# Patient Record
Sex: FEMALE | Race: WHITE
[De-identification: names, ages, dates, MRNs, and addresses within clinical notes are randomized per-mention and may not be internally consistent; named-entity substitution may affect disease eponyms.]

---

## 2017-03-04 ENCOUNTER — HOSPITAL ENCOUNTER (EMERGENCY)
Dept: HOSPITAL 47 - EC | Age: 47
Discharge: HOME | End: 2017-03-04
Payer: COMMERCIAL

## 2017-03-04 VITALS
TEMPERATURE: 98.7 F | DIASTOLIC BLOOD PRESSURE: 82 MMHG | HEART RATE: 77 BPM | RESPIRATION RATE: 20 BRPM | SYSTOLIC BLOOD PRESSURE: 148 MMHG

## 2017-03-04 DIAGNOSIS — Z79.899: ICD-10-CM

## 2017-03-04 DIAGNOSIS — R13.10: Primary | ICD-10-CM

## 2017-03-04 DIAGNOSIS — J45.909: ICD-10-CM

## 2017-03-04 PROCEDURE — 99283 EMERGENCY DEPT VISIT LOW MDM: CPT

## 2017-03-04 NOTE — ED
General Adult HPI





- General


Chief complaint: ENT


Stated complaint: feels like something caught in throat


Time Seen by Provider: 03/04/17 13:50


Source: patient, RN notes reviewed


Mode of arrival: ambulatory


Limitations: no limitations





- History of Present Illness


Initial comments: 





Is a 46-year-old female presents to the emergency department stating that on 

Monday she had taken some fiber pills negative stuck in her throat and then she 

was eating some bread net felt like it got stuck as well.  Patient states there 

are times she something he feels like it stuck in her throat however she has 

never vomited anything back up.  Patient states she ate scrambled eggs and ice 

cream and has been able to drink without having it get stuck.  Patient states 

currently she is not having any symptoms right now but she refuses to eat so she

's wondering if someone can scope her.  I told her that no one will come in 

emergently unless something is stuck by offered to have her try something in 

the ER to eat and we could assess her as she eats it and after she eats.  

Patient refused to eat anything in the ER she stated that she would just as 

soon wait to see her primary medical care doctor on Tuesday.  Patient denies 

any other symptoms at this time.  Patient denies chest pain difficulty 

breathing or shortness of breath per patient denies headache patient denies 

numbness weakness.  Patient denies lightheadedness dizziness or nursing blood 

cell.  Patient denies any recent fever chills cough.  Patient denies abdominal 

pain.





- Related Data


 Home Medications











 Medication  Instructions  Recorded  Confirmed


 


Doxycycline Monohydrate [Monodox] 100 mg PO DAILY 03/04/17 03/04/17


 


Hydrochlorothiazide [Hydrodiuril] 25 mg PO DAILY PRN 03/04/17 03/04/17


 


Loratadine [Claritin] 10 mg PO DAILY 03/04/17 03/04/17


 


Montelukast [Singulair] 10 mg PO DAILY 03/04/17 03/04/17











 Allergies











Allergy/AdvReac Type Severity Reaction Status Date / Time


 


No Known Allergies Allergy   Verified 03/04/17 14:08














Review of Systems


ROS Statement: 


Those systems with pertinent positive or pertinent negative responses have been 

documented in the HPI.





ROS Other: All systems not noted in ROS Statement are negative.





Past Medical History


Past Medical History: Asthma


History of Any Multi-Drug Resistant Organisms: None Reported


Past Surgical History: No Surgical Hx Reported


Past Psychological History: Anxiety, Depression


Smoking Status: Never smoker


Past Alcohol Use History: None Reported


Past Drug Use History: None Reported





General Exam





- General Exam Comments


Initial Comments: 





GENERAL:


Patient is well-developed and well-nourished.  Patient is nontoxic and well-

hydrated and is in no acute distress.





ENT:


Neck is soft and supple.  No significant lymphadenopathy is noted.  Oropharynx 

is clear.  Moist mucous membranes.  Neck has full range of motion without 

eliciting any pain.  There is no thyroid enlargement and no masses were felt.





EYES:


The sclera were anicteric and conjunctiva were pink and moist.  Extraocular 

movements were intact and pupils were equal round and reactive to light.  

Eyelids were unremarkable.





PULMONARY:


Unlabored respirations. 





ABDOMEN:


Soft and nontender with normal bowel sounds.  No palpable organomegaly was 

noted.  There is no palpable pulsatile mass.





SKIN:


Skin is clear with no lesions or rashes and otherwise unremarkable.





NEUROLOGIC:


Patient is alert and oriented x3.  Cranial nerves II through XII are grossly 

intact.  Motor and sensory are also intact.  Normal speech, volume and content.

  Symmetrical smile. 





MUSCULOSKELETAL:


Normal extremities with adequate strength and full range of motion.  





LYMPHATICS:


No significant lymphadenopathy is noted





PSYCHIATRIC:


Normal psychiatric evaluation. 


Limitations: no limitations





Course





 Vital Signs











  03/04/17





  13:48


 


Temperature 98.7 F


 


Pulse Rate 77


 


Respiratory 20





Rate 


 


Blood Pressure 148/82


 


O2 Sat by Pulse 99





Oximetry 














Disposition


Clinical Impression: 


 Dysphagia





Disposition: HOME SELF-CARE


Condition: Good


Instructions:  Dysphagia (ED)


Referrals: 


None,Stated [Primary Care Provider] - 1-2 days


Time of Disposition: 14:26

## 2021-11-24 ENCOUNTER — HOSPITAL ENCOUNTER (EMERGENCY)
Dept: HOSPITAL 47 - EC | Age: 51
Discharge: HOME | End: 2021-11-24
Payer: COMMERCIAL

## 2021-11-24 VITALS
RESPIRATION RATE: 20 BRPM | TEMPERATURE: 98.7 F | HEART RATE: 87 BPM | DIASTOLIC BLOOD PRESSURE: 81 MMHG | SYSTOLIC BLOOD PRESSURE: 137 MMHG

## 2021-11-24 DIAGNOSIS — Z79.51: ICD-10-CM

## 2021-11-24 DIAGNOSIS — T17.228A: Primary | ICD-10-CM
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
## 2021-11-24 DIAGNOSIS — J45.909: ICD-10-CM

## 2021-11-24 PROCEDURE — 99283 EMERGENCY DEPT VISIT LOW MDM: CPT

## 2021-11-24 NOTE — ED
ENT HPI





- General


Chief complaint: ENT


Stated complaint: food in throat


Time Seen by Provider: 11/24/21 16:04


Source: patient, RN notes reviewed


Mode of arrival: ambulatory


Limitations: no limitations





- History of Present Illness


Initial comments: 





Patient is a 51-year-old female presenting to the emergency department with 

concerns of possible food stuck in her throat.  She states this happened about a

week and a half ago, she felt like she could not get food down all the way.  

Patient [facility] evaluated [facility] last week, they recommended coming

here for a scope.  Patient was supposed to follow-up with endo-last week however

she did not.  Patient presents today after she ate some eggs this morning and 

feels like they're stuck.  She is able to tolerate liquids.  His had no 

vomiting.  She denies any abdominal pain, no chest pain or shortness of breath. 

She has no fevers or chills, no upper respiratory symptoms.  She is recovering f

rom a sinus infection, she'll she does have some mild congestion.  Patient has 

no other complaints today.  Her vital signs are stable upon arrival.





- Related Data


                                Home Medications











 Medication  Instructions  Recorded  Confirmed


 


Doxycycline Monohydrate [Monodox] 100 mg PO DAILY 03/04/17 03/04/17


 


Loratadine [Claritin] 10 mg PO DAILY 03/04/17 03/04/17


 


Montelukast [Singulair] 10 mg PO DAILY 03/04/17 03/04/17


 


hydroCHLOROthiazide [Hydrodiuril] 25 mg PO DAILY PRN 03/04/17 03/04/17








                                  Previous Rx's











 Medication  Instructions  Recorded


 


Metoclopramide [Reglan] 10 mg PO BID #10 tab 11/24/21











                                    Allergies











Allergy/AdvReac Type Severity Reaction Status Date / Time


 


No Known Allergies Allergy   Verified 11/24/21 14:31














Review of Systems


ROS Statement: 


Those systems with pertinent positive or pertinent negative responses have been 

documented in the HPI.





ROS Other: All systems not noted in ROS Statement are negative.





Past Medical History


Past Medical History: Asthma


History of Any Multi-Drug Resistant Organisms: None Reported


Past Surgical History: No Surgical Hx Reported


Past Psychological History: Anxiety, Depression


Smoking Status: Never smoker


Past Alcohol Use History: None Reported


Past Drug Use History: None Reported





General Exam





- General Exam Comments


Initial Comments: 





GENERAL: 


Patient is well-developed and well-nourished.  Patient is nontoxic and in no 

acute distress.





HEAD: 


Atraumatic, normocephalic.





EYES:


Pupils equal round and reactive to light, extraocular movements intact, sclera 

anicteric, conjunctiva are normal.  Eyelids were unremarkable.





ENT: 


Oropharynx clear without exudates.  Moist mucous membranes.





NECK: 


Normal range of motion, supple without lymphadenopathy or JVD.





LUNGS:


Unlabored respirations.  Breath sounds clear to auscultation bilaterally and 

equal.  No wheezes rales or rhonchi.





HEART:


Regular rate and rhythm without murmurs, rubs or gallops.





ABDOMEN: 


Soft, nontender, normoactive bowel sounds.  No guarding, no rebound.  No masses 

appreciated.





NEUROLOGICAL: 


Patient is alert and oriented x 3.  Normal speech, normal gait.   





PSYCH:


Normal mood, normal affect.





SKIN:


 Warm, Dry, normal turgor, no rashes or lesions noted.


Limitations: no limitations





Course


                                   Vital Signs











  11/24/21





  14:32


 


Temperature 98.7 F


 


Pulse Rate 87


 


Respiratory 20





Rate 


 


Blood Pressure 137/81


 


O2 Sat by Pulse 97





Oximetry 














Medical Decision Making





- Medical Decision Making





Patient is a 51-year-old female here with complaint of possible food stuck in 

her throat over the past 2 weeks.  She was evaluated at [facility] 

last week, was told to follow-up with endo-or come here for further evaluation. 

 Patient is in no acute distress, she did have a chest x-ray at [facility] and there was no complicating factors seen.  I discussed with her that 

we do not have GI coverage here for the week.  Also she is tolerating oral 

fluids, she's had no vomiting.  I recommended following up with them as an 

outpatient.  She is agreeable to this.  She stable for discharge.





Disposition


Clinical Impression: 


 Foreign body sensation in throat





Disposition: HOME SELF-CARE


Condition: Stable


Instructions (If sedation given, give patient instructions):  Esophageal Foreign

 Body (ED)


Additional Instructions: 


Please return to the Emergency Department if symptoms worsen or any other 

concerns.


Trial of Reglan to help with symptoms. May crush up tablet in food 


Also recommend a carbonated beverages.


Follow up with GI as discussed.


Prescriptions: 


Metoclopramide [Reglan] 10 mg PO BID #10 tab


Is patient prescribed a controlled substance at d/c from ED?: No


Referrals: 


Tevin Vyas MD [Primary Care Provider] - 1-2 days


Do Kim MD [STAFF PHYSICIAN] - 1-2 days


Time of Disposition: 17:03

## 2022-04-28 ENCOUNTER — HOSPITAL ENCOUNTER (OUTPATIENT)
Dept: HOSPITAL 47 - ORWHC2ENDO | Age: 52
End: 2022-04-28
Attending: INTERNAL MEDICINE
Payer: COMMERCIAL

## 2022-04-28 VITALS
SYSTOLIC BLOOD PRESSURE: 155 MMHG | TEMPERATURE: 97.6 F | DIASTOLIC BLOOD PRESSURE: 76 MMHG | HEART RATE: 64 BPM | RESPIRATION RATE: 16 BRPM

## 2022-04-28 VITALS — BODY MASS INDEX: 21 KG/M2

## 2022-04-28 DIAGNOSIS — J45.909: ICD-10-CM

## 2022-04-28 DIAGNOSIS — E78.5: ICD-10-CM

## 2022-04-28 DIAGNOSIS — Z79.899: ICD-10-CM

## 2022-04-28 DIAGNOSIS — F41.9: ICD-10-CM

## 2022-04-28 DIAGNOSIS — Z98.890: ICD-10-CM

## 2022-04-28 DIAGNOSIS — R13.10: Primary | ICD-10-CM

## 2022-04-28 DIAGNOSIS — F32.A: ICD-10-CM

## 2022-04-28 PROCEDURE — 91010 ESOPHAGUS MOTILITY STUDY: CPT

## 2022-05-04 NOTE — PCN
PROCEDURE NOTE



DATE OF DICTATION:

05/04/2022



BRIEF HISTORY:

The patient is a 51-year-old white female scheduled for esophageal manometry as a part

of evaluation of dysphagia.



PROCEDURE PERFORMED:

High-resolution impedance esophageal manometry.



PREOPERATIVE DIAGNOSIS:

Dysphagia.



PROCEDURE DESCRIPTION:

After informed consent was obtained from the patient, she was brought into the

endoscopy unit.  The procedure was performed by endoscopy nurse July Saleem. The

esophageal manometry catheter was passed from the external nostril and was gently

advanced into the esophagus and stomach, and the study was performed using liquid and

viscous swallows. Using [place] classification, the study results are interpreted as

follows:

1. Lower esophageal sphincter data: Mean IRP 12 mmHg.

2. Lower esophageal sphincter data: DCI is normal. Peristaltic contractions were 80%.

    Complete bolus transit with liquids 90%. Complete viscous transit 70%. Peristaltic

    contractions were 80%.  No evidence of simultaneous or spontaneous contractions.



INTERPRETATION:

The above esophageal manometry study is within normal limits.  There is no evidence of

esophageal dysmotility.





MMODL / IJN: [id number] / Job#: [id number]